# Patient Record
Sex: FEMALE | Race: WHITE | NOT HISPANIC OR LATINO | Employment: OTHER | URBAN - NONMETROPOLITAN AREA
[De-identification: names, ages, dates, MRNs, and addresses within clinical notes are randomized per-mention and may not be internally consistent; named-entity substitution may affect disease eponyms.]

---

## 2017-08-30 ENCOUNTER — OFFICE VISIT (OUTPATIENT)
Dept: OBSTETRICS AND GYNECOLOGY | Facility: CLINIC | Age: 52
End: 2017-08-30

## 2017-08-30 VITALS
DIASTOLIC BLOOD PRESSURE: 60 MMHG | BODY MASS INDEX: 22.34 KG/M2 | SYSTOLIC BLOOD PRESSURE: 112 MMHG | WEIGHT: 139 LBS | HEIGHT: 66 IN

## 2017-08-30 DIAGNOSIS — S30.814A LABIAL ABRASION, INITIAL ENCOUNTER: Primary | ICD-10-CM

## 2017-08-30 PROCEDURE — 99214 OFFICE O/P EST MOD 30 MIN: CPT | Performed by: OBSTETRICS & GYNECOLOGY

## 2017-08-30 RX ORDER — PAROXETINE 10 MG/1
TABLET, FILM COATED ORAL
COMMUNITY
Start: 2017-08-18

## 2017-08-30 NOTE — PROGRESS NOTES
Subjective   Chief Complaint   Patient presents with   • Labial Lesion     Pt states yesterday had a small bump appear in middle of labia, which burned and itched and today the bump is not as a big but really raw     Ilene Fowler is a 52 y.o. year old .  No LMP recorded. Patient is postmenopausal.  She presents to be seen because of   Pt states yesterday had a small bump appear in middle of labia, which burned and itched and today the bump is not as a big but really raw   Had this sort before without the bump though 2 months ago.   [place]  No recent intercourse. But had intercourse preceedning this initial episode  No OTC treatments    OTHER COMPLAINTS:  Nothing else    The following portions of the patient's history were reviewed and updated as appropriate:  She  has a past medical history of Anxiety and Arthritis.  She  does not have a problem list on file.  She  has a past surgical history that includes Tubal ligation.  Her family history includes Coronary artery disease in her brother; Diabetes in her brother; Lung cancer in her father.  She  reports that she has quit smoking. She has never used smokeless tobacco. She reports that she does not drink alcohol or use illicit drugs.  Current Outpatient Prescriptions   Medication Sig Dispense Refill   • PARoxetine (PAXIL) 10 MG tablet        No current facility-administered medications for this visit.      No current outpatient prescriptions on file prior to visit.     No current facility-administered medications on file prior to visit.      She has No Known Allergies.    Smoking status: Former Smoker                                                              Packs/day: 0.00      Years: 0.00      Smokeless status: Never Used                        Review of Systems  Consitutional POS: nothing reported    NEG: anorexia or night sweats   Gastointestinal POS: nothing reported    NEG: bloating, change in bowel habits, melena or reflux symptoms   Genitourinary  "POS: nothing reported    NEG: dysuria or hematuria   Integument POS: nothing reported    NEG: moles that are changing in size, shape, color or rashes   Breast POS: nothing reported    NEG: persistent breast lump, skin dimpling or nipple discharge         Ears, nose, mouth, throat, and face: negative  Respiratory: negative  Cardiovascular: negative  GYN:  as above  Hematologic/lymphatic: negative  Musculoskeletal:negative  Neurological: negative  Behavioral/Psych: negative  Endocrine: negative          Objective   /60  Ht 66\" (167.6 cm)  Wt 139 lb (63 kg)  BMI 22.44 kg/m2    General:  well developed; well nourished  no acute distress   Skin:  No suspicious lesions seen   Thyroid: normal to inspection and palpation   Lungs:  breathing is unlabored  clear to auscultation bilaterally   Heart:  regular rate and rhythm, S1, S2 normal, no murmur, click, rub or gallop   Breasts:  Not performed.   Abdomen: soft, non-tender; no masses  no umbilical or inginual hernias are present  no hepato-splenomegaly   Pelvis: Clinical staff was present for exam  External genitalia:  left labial excoriation  :  urethral meatus normal;  Vaginal:  normal pink mucosa without prolapse or lesions.  Cervix:  normal appearance.  Uterus:  normal size, shape and consistency.  Adnexa:  normal bimanual exam of the adnexa.  Rectal:  digital rectal exam not performed; anus visually normal appearing.     Psychiatric: Alert and oriented ×3, mood and affect appropriate  HEENT: Atraumatic, normocephalic, normal scleral icterus  Extremities: 2+ pulses bilaterally, no edema      Lab Review   No data reviewed    Imaging   No data reviewed        Assessment   1. Left labial excoriation-     Plan   1. Cultures taken, sitz baths, aquaphor         This note was electronically signed.      August 30, 2017      "

## 2017-08-30 NOTE — PATIENT INSTRUCTIONS
Abrasion  An abrasion is a cut or scrape on the outer surface of your skin. An abrasion does not extend through all of the layers of your skin. It is important to care for your abrasion properly to prevent infection.  CAUSES  Most abrasions are caused by falling on or gliding across the ground or another surface. When your skin rubs on something, the outer and inner layer of skin rubs off.   SYMPTOMS  A cut or scrape is the main symptom of this condition. The scrape may be bleeding, or it may appear red or pink. If there was an associated fall, there may be an underlying bruise.  DIAGNOSIS  An abrasion is diagnosed with a physical exam.  TREATMENT  Treatment for this condition depends on how large and deep the abrasion is. Usually, your abrasion will be cleaned with water and mild soap. This removes any dirt or debris that may be stuck. An antibiotic ointment may be applied to the abrasion to help prevent infection. A bandage (dressing) may be placed on the abrasion to keep it clean.  You may also need a tetanus shot.  HOME CARE INSTRUCTIONS  Medicines  · Take or apply medicines only as directed by your health care provider.  · If you were prescribed an antibiotic ointment, finish all of it even if you start to feel better.  Wound Care  · Clean the wound with mild soap and water 2-3 times per day or as directed by your health care provider. Pat your wound dry with a clean towel. Do not rub it.  · There are many different ways to close and cover a wound. Follow instructions from your health care provider about:    Wound care.    Dressing changes and removal.  · Check your wound every day for signs of infection. Watch for:    Redness, swelling, or pain.    Fluid, blood, or pus.  General Instructions  · Keep the dressing dry as directed by your health care provider. Do not take baths, swim, use a hot tub, or do anything that would put your wound underwater until your health care provider approves.  · If there is  swelling, raise (elevate) the injured area above the level of your heart while you are sitting or lying down.  · Keep all follow-up visits as directed by your health care provider. This is important.  SEEK MEDICAL CARE IF:  · You received a tetanus shot and you have swelling, severe pain, redness, or bleeding at the injection site.  · Your pain is not controlled with medicine.  · You have increased redness, swelling, or pain at the site of your wound.  SEEK IMMEDIATE MEDICAL CARE IF:  · You have a red streak going away from your wound.  · You have a fever.  · You have fluid, blood, or pus coming from your wound.  · You notice a bad smell coming from your wound or your dressing.     This information is not intended to replace advice given to you by your health care provider. Make sure you discuss any questions you have with your health care provider.     Document Released: 09/27/2006 Document Revised: 09/07/2016 Document Reviewed: 12/16/2015  [employer] Interactive Patient Education ©2017 [employer]

## 2017-09-03 LAB — WRITTEN AUTHORIZATION: NORMAL

## 2017-09-05 ENCOUNTER — TELEPHONE (OUTPATIENT)
Dept: OBSTETRICS AND GYNECOLOGY | Facility: CLINIC | Age: 52
End: 2017-09-05

## 2017-09-05 RX ORDER — VALACYCLOVIR HYDROCHLORIDE 500 MG/1
TABLET, FILM COATED ORAL
Qty: 30 TABLET | Refills: 0 | Status: SHIPPED | OUTPATIENT
Start: 2017-09-05 | End: 2017-09-05 | Stop reason: CLARIF

## 2017-09-05 RX ORDER — ACYCLOVIR 400 MG/1
TABLET ORAL
Qty: 30 TABLET | Refills: 0 | Status: SHIPPED | OUTPATIENT
Start: 2017-09-05 | End: 2019-12-18

## 2017-09-05 NOTE — TELEPHONE ENCOUNTER
----- Message from Surya Mccullough MD sent at 9/4/2017 11:55 AM EDT -----  Valtrex 1gm po BID x 10 days

## 2017-09-06 ENCOUNTER — OFFICE VISIT (OUTPATIENT)
Dept: OBSTETRICS AND GYNECOLOGY | Facility: CLINIC | Age: 52
End: 2017-09-06

## 2017-09-06 VITALS
SYSTOLIC BLOOD PRESSURE: 112 MMHG | WEIGHT: 138 LBS | DIASTOLIC BLOOD PRESSURE: 66 MMHG | HEIGHT: 66 IN | BODY MASS INDEX: 22.18 KG/M2

## 2017-09-06 DIAGNOSIS — B00.9 HSV-2 (HERPES SIMPLEX VIRUS 2) INFECTION: Primary | ICD-10-CM

## 2017-09-06 PROCEDURE — 99213 OFFICE O/P EST LOW 20 MIN: CPT | Performed by: OBSTETRICS & GYNECOLOGY

## 2017-09-06 RX ORDER — ACYCLOVIR 400 MG/1
400 TABLET ORAL 2 TIMES DAILY
Qty: 60 TABLET | Refills: 12 | Status: SHIPPED | OUTPATIENT
Start: 2017-09-06 | End: 2018-08-09 | Stop reason: SDUPTHER

## 2017-09-06 NOTE — PROGRESS NOTES
"Subjective   Chief Complaint   Patient presents with   • Follow-up     Pt would like to discuss recent lab results      Ilene Fowler is a 52 y.o. year old .  No LMP recorded. Patient is postmenopausal.  She presents to be seen because of recent dx of HSV II.     OTHER COMPLAINTS:  Nothing else    The following portions of the patient's history were reviewed and updated as appropriate:current medications, allergies, past family history, past medical history, past social history and past surgical history    Smoking status: Former Smoker                                                              Packs/day: 0.00      Years: 0.00      Smokeless status: Never Used                        Review of Systems  Consitutional POS: nothing reported    NEG: anorexia or night sweats   Gastointestinal POS: nothing reported    NEG: bloating, change in bowel habits, melena or reflux symptoms   Genitourinary POS: nothing reported    NEG: dysuria or hematuria   Integument POS: nothing reported    NEG: moles that are changing in size, shape, color or rashes   Breast POS: nothing reported    NEG: persistent breast lump, skin dimpling or nipple discharge         Pertinent items are noted in HPI.          Objective   /66  Ht 66\" (167.6 cm)  Wt 138 lb (62.6 kg)  BMI 22.27 kg/m2    General:  well developed; well nourished  no acute distress   Skin:  No suspicious lesions seen   Thyroid: normal to inspection and palpation   Lungs:  breathing is unlabored   Heart:  Not performed.   Breasts:  Examined in supine position  Symmetric without masses or skin dimpling  Nipples normal without inversion, lesions or discharge  There are no palpable axillary nodes   Abdomen: soft, non-tender; no masses  no umbilical or inginual hernias are present  no hepato-splenomegaly   Pelvis: Clinical staff was present for exam  External genitalia:  normal appearance of the external genitalia including Bartholin's and Odem's glands.  :  " urethral meatus normal;  Vaginal:  normal pink mucosa without prolapse or lesions.  Cervix:  normal appearance.  Uterus:  normal size, shape and consistency.  Adnexa:  normal bimanual exam of the adnexa.  Rectal:  digital rectal exam not performed; anus visually normal appearing.     Psychiatric: Alert and oriented ×3, mood and affect appropriate  HEENT: Atraumatic, normocephalic, normal scleral icterus  Extremities: 2+ pulses bilaterally, no edema      Lab Review   No data reviewed and STD swabs for HSV    Imaging   No data reviewed        Assessment   1. HSV II     Plan   1. Primary treament now-start Suppression  2. Acyclovir suppression    No orders of the defined types were placed in this encounter.         This note was electronically signed.      September 6, 2017

## 2017-09-06 NOTE — PATIENT INSTRUCTIONS
Herpes Simplex Test  There are two common types of herpes simplex virus (HSV). These are classified as Type 1 (HSV1) or Type 2 (HSV2). Type 1 often causes cold sores on or around the mouth and sometimes on or around the eyes. Type 2 is commonly known as a sexually transmitted infection that causes sores in and around the genitals. Both types of herpes simplex can cause sores in different areas.  There are two types of herpes simplex tests. These include:  · Culture. This consists of collecting and testing a sample of fluid with a cotton swab from an open sore. This can only be done during an active infection (outbreak). Culture tests take several days to complete but are very accurate.  · HSV blood tests. This test is not as accurate as a culture. However, HSV blood tests are faster than cultures, often providing a test result within one day.    HSV antibody test. This checks for the presence of antibodies against HSV in your blood. Antibodies are proteins your body makes to help fight infection.    HSV antigen test. This checks for the presence of the HSV germ (antigen) in your blood.  Your health care provider may recommend you have a HSV test if:  · He or she believes you have a HSV infection.  · You have a weakened immune system (immunocompromised) and you have sores around your mouth or genitals that look like HSV eruptions.  · You have a fever of unknown origin (FUO).  · You are pregnant, have herpes, and are expecting to deliver a baby vaginally in the next 6-8 weeks.  RESULTS  It is your responsibility to obtain your test results. Ask the lab or department performing the test when and how you will get your results. Contact your health care provider to discuss any questions you have about your results.  Range of Normal Values  Ranges for normal values may vary among different labs and hospitals. You should always check with your health care provider after having lab work or other tests done to discuss whether  your values are considered within normal limits. Normal findings include:  · No HSV antigen or antibodies present in your blood.  · No HSV antigen present in cultured fluid.  Meaning of Results Outside Normal Ranges  The following test results may indicate that you have an active HSV infection:  · Positive culture for HSV1 or HSV2.  · Presence of HSV1 or HSV2 antigens in your blood.  · Presence of certain HSV1 or HSV2 antibodies (IgM) in your blood.  Discuss your test results with your health care provider. He or she will use the results to make a diagnosis and determine a treatment plan that is right for you.     This information is not intended to replace advice given to you by your health care provider. Make sure you discuss any questions you have with your health care provider.     Document Released: 01/20/2006 Document Revised: 01/08/2016 Document Reviewed: 05/05/2015  [employer] Interactive Patient Education ©2017 [employer]

## 2017-09-08 LAB
HSV1 DNA SPEC QL NAA+PROBE: NORMAL
HSV2 DNA SPEC QL NAA+PROBE: NORMAL
Lab: NORMAL
REQUEST PROBLEM: NORMAL

## 2017-09-13 ENCOUNTER — OFFICE VISIT (OUTPATIENT)
Dept: OBSTETRICS AND GYNECOLOGY | Facility: CLINIC | Age: 52
End: 2017-09-13

## 2017-09-13 VITALS
WEIGHT: 138 LBS | SYSTOLIC BLOOD PRESSURE: 112 MMHG | HEIGHT: 66 IN | DIASTOLIC BLOOD PRESSURE: 70 MMHG | BODY MASS INDEX: 22.18 KG/M2

## 2017-09-13 DIAGNOSIS — Z01.419 ENCOUNTER FOR GYNECOLOGICAL EXAMINATION WITHOUT ABNORMAL FINDING: Primary | ICD-10-CM

## 2017-09-13 PROCEDURE — 99396 PREV VISIT EST AGE 40-64: CPT | Performed by: OBSTETRICS & GYNECOLOGY

## 2017-09-13 NOTE — PROGRESS NOTES
Subjective   Chief Complaint   Patient presents with   • Gynecologic Exam     Last pap 2015 WNL Hx of Abnormal Pap/ Leep 3 years ago        Ilene Fowler is a 52 y.o. year old  presenting to be seen for her annual exam.     SEXUAL Hx:  She is currently sexually active.  In the past year there has not been new sexual partners.    Condoms are not typically used.  She would not like to be screened for STD's at today's exam.  Current birth control method: not using any form of contraception and does not wish to get pregnant.  MENSTRUAL Hx:  No LMP recorded. Patient is postmenopausal.  In the past 6 months her cycles have been regular, predictable and occur monthly.   Her menstrual flow is normal.   Each month on average there are roughly 0 days of very heavy flow.    Intermenstrual bleeding is absent.    Post-coital bleeding is absent.  Dysmenorrhea: is not affecting her activities of daily living  PMS: is not affecting her activities of daily living  Her cycles are not a source of concern for her that she wishes to discuss today.  HEALTH Hx:  She exercises regularly: no (and has no plans to become more active).  She wears her seat belt:yes.  She has concerns about domestic violence: no.  OTHER COMPLAINTS:  Nothing else    The following portions of the patient's history were reviewed and updated as appropriate:  She  has a past medical history of Abnormal Pap smear of cervix; Anxiety; Arthritis; and HSV (herpes simplex virus) anogenital infection.  She  does not have any pertinent problems on file.  She  has a past surgical history that includes Tubal ligation and Cervical biopsy w/ loop electrode excision.  Her family history includes Coronary artery disease in her brother; Diabetes in her brother; Lung cancer in her father.  She  reports that she has quit smoking. She has never used smokeless tobacco. She reports that she does not drink alcohol or use illicit drugs.  Current Outpatient Prescriptions  "  Medication Sig Dispense Refill   • acyclovir (ZOVIRAX) 400 MG tablet Take po TID for 10 days 30 tablet 0   • acyclovir (ZOVIRAX) 400 MG tablet Take 1 tablet by mouth 2 (Two) Times a Day. If outbreak occurs while on suppressive therapy, call office for further instructions. 60 tablet 12   • PARoxetine (PAXIL) 10 MG tablet        No current facility-administered medications for this visit.      Current Outpatient Prescriptions on File Prior to Visit   Medication Sig   • acyclovir (ZOVIRAX) 400 MG tablet Take po TID for 10 days   • acyclovir (ZOVIRAX) 400 MG tablet Take 1 tablet by mouth 2 (Two) Times a Day. If outbreak occurs while on suppressive therapy, call office for further instructions.   • PARoxetine (PAXIL) 10 MG tablet      No current facility-administered medications on file prior to visit.      She has No Known Allergies..    Smoking status: Former Smoker                                                              Packs/day: 0.00      Years: 0.00      Smokeless status: Never Used                        Review of Systems  Consitutional NEG: anorexia or night sweats    POS: nothing reported   Gastointestinal NEG: bloating, change in bowel habits, melena or reflux symptoms    POS: nothing reported   Genitourinary NEG: dysuria or hematuria    POS: nothing reported   Integument NEG: moles that are changing in size, shape, color or rashes    POS: nothing reported   Breast NEG: persistent breast lump, skin dimpling or nipple discharge    POS: nothing reported          Objective   /70  Ht 66\" (167.6 cm)  Wt 138 lb (62.6 kg)  BMI 22.27 kg/m2    General:  well developed; well nourished  no acute distress  appears stated age   Skin:  No suspicious lesions seen   Thyroid: normal to inspection and palpation   Breasts:  Examined in supine position  Symmetric without masses or skin dimpling  Nipples normal without inversion, lesions or discharge  There are no palpable axillary nodes   Abdomen: soft, " non-tender; no masses  no umbilical or inginual hernias are present  no hepato-splenomegaly   Pelvis: Clinical staff was present for exam  External genitalia:  normal appearance of the external genitalia including Bartholin's and Parlier's glands.  :  urethral meatus normal;  Vaginal:  normal pink mucosa without prolapse or lesions.  Cervix:  normal appearance.  Uterus:  normal size, shape and consistency.  Adnexa:  normal bimanual exam of the adnexa.  Rectal:  digital rectal exam not performed; anus visually normal appearing.        Assessment   1. Normal PE     Plan   1. PAP done  2. MMG  3. Follow up     No orders of the defined types were placed in this encounter.         This note was electronically signed.      September 13, 2017

## 2017-09-28 DIAGNOSIS — Z01.419 ENCOUNTER FOR GYNECOLOGICAL EXAMINATION WITHOUT ABNORMAL FINDING: ICD-10-CM

## 2017-10-18 ENCOUNTER — OFFICE VISIT (OUTPATIENT)
Dept: OBSTETRICS AND GYNECOLOGY | Facility: CLINIC | Age: 52
End: 2017-10-18

## 2017-10-18 VITALS
DIASTOLIC BLOOD PRESSURE: 70 MMHG | WEIGHT: 140 LBS | BODY MASS INDEX: 22.5 KG/M2 | SYSTOLIC BLOOD PRESSURE: 116 MMHG | HEIGHT: 66 IN

## 2017-10-18 DIAGNOSIS — R87.610 PAP SMEAR ABNORMALITY OF CERVIX WITH ASCUS FAVORING BENIGN: Primary | ICD-10-CM

## 2017-10-18 PROCEDURE — 57454 BX/CURETT OF CERVIX W/SCOPE: CPT | Performed by: OBSTETRICS & GYNECOLOGY

## 2017-10-18 NOTE — PROGRESS NOTES
Colposcopy    Date of procedure:  10/18/2017    Risks and benefits discussed? yes  All questions answered? yes  Consents given by the patient  Written consent obtained? yes    Pre-op indication: ASCUS with POSITIVE high risk HPV, H/O LEEP 2.5 years ago  Procedure documentation:    The cervix was initially viewed colposcopically.  The cervix was next bathed in acetic acid.   The findings were as follows:      The transformation zone was able to be seen adequately.    No visible lesions    Ectocervical biopsies taken from 1 o'clock.  Monsels solution was applied to the biopsy sites..    An ECC was performed.      Colposcopic Impression: 1. Adequate colposcopy  2. Colposcopic findings are consistent with PAP       Plan: Will base further treatment on pathology results      This note was electronically signed.    Surya Mccullough MD.

## 2017-10-18 NOTE — PATIENT INSTRUCTIONS
Colposcopy  Colposcopy is a procedure to examine your cervix and vagina, or the area around the outside of your vagina, for abnormalities or signs of disease. The procedure is done using a lighted microscope called a colposcope. Tissue samples may be collected during the colposcopy if your health care provider finds any unusual cells. A colposcopy may be done if a woman has:  · An abnormal Pap test. A Pap test is a medical test done to evaluate cells that are on the surface of the cervix.  · A Pap test result that is suggestive of human papillomavirus (HPV). This virus can cause genital warts and is linked to the development of cervical cancer.  · A sore on her cervix and the results of a Pap test were normal.  · Genital warts on the cervix or in or around the outside of the vagina.  · A mother who took the drug diethylstilbestrol (LISSET) while pregnant.  · Painful intercourse.  · Vaginal bleeding, especially after sexual intercourse.  LET YOUR HEALTH CARE PROVIDER KNOW ABOUT:  · Any allergies you have.  · All medicines you are taking, including vitamins, herbs, eye drops, creams, and over-the-counter medicines.  · Previous problems you or members of your family have had with the use of anesthetics.  · Any blood disorders you have.  · Previous surgeries you have had.  · Medical conditions you have.  RISKS AND COMPLICATIONS  Generally, a colposcopy is a safe procedure. However, as with any procedure, complications can occur. Possible complications include:  · Bleeding.  · Infection.  · Missed lesions.  BEFORE THE PROCEDURE   · Tell your health care provider if you have your menstrual period. A colposcopy typically is not done during menstruation.  · For 24 hours before the colposcopy, do not:    [place].    Use tampons.    Use medicines, creams, or suppositories in the vagina.    Have sexual intercourse.  PROCEDURE   During the procedure, you will be lying on your back with your feet in foot rests (stirrups). A warm  metal or plastic instrument (speculum) will be placed in your vagina to keep it open and to allow the health care provider to see the cervix. The colposcope will be placed outside the vagina. It will be used to magnify and examine the cervix, vagina, and the area around the outside of the vagina. A small amount of liquid solution will be placed on the area that is to be viewed. This solution will make it easier to see the abnormal cells. Your health care provider will use tools to suck out mucus and cells from the canal of the cervix. Then he or she will record the location of the abnormal areas.  If a biopsy is done during the procedure, a medicine will usually be given to numb the area (local anesthetic). You may feel mild pain or cramping while the biopsy is done. After the procedure, tissue samples collected during the biopsy will be sent to a lab for analysis.  AFTER THE PROCEDURE   You will be given instructions on when to follow up with your health care provider for your test results. It is important to keep your appointment.     This information is not intended to replace advice given to you by your health care provider. Make sure you discuss any questions you have with your health care provider.     Document Released: 03/09/2004 Document Revised: 08/20/2014 Document Reviewed: 07/17/2014  [employer] Interactive Patient Education ©2017 [employer]

## 2017-10-26 DIAGNOSIS — R87.610 PAP SMEAR ABNORMALITY OF CERVIX WITH ASCUS FAVORING BENIGN: ICD-10-CM

## 2017-10-27 DIAGNOSIS — R87.610 PAP SMEAR ABNORMALITY OF CERVIX WITH ASCUS FAVORING BENIGN: ICD-10-CM

## 2017-11-06 ENCOUNTER — HOSPITAL ENCOUNTER (OUTPATIENT)
Dept: MAMMOGRAPHY | Facility: HOSPITAL | Age: 52
Discharge: HOME | End: 2017-11-06
Attending: OBSTETRICS & GYNECOLOGY | Admitting: OBSTETRICS & GYNECOLOGY

## 2017-11-06 DIAGNOSIS — Z01.419 ENCOUNTER FOR GYNECOLOGICAL EXAMINATION WITHOUT ABNORMAL FINDING: ICD-10-CM

## 2017-11-06 PROCEDURE — 77063 BREAST TOMOSYNTHESIS BI: CPT

## 2017-11-06 PROCEDURE — G0202 SCR MAMMO BI INCL CAD: HCPCS

## 2018-02-02 ENCOUNTER — TELEPHONE (OUTPATIENT)
Dept: OBSTETRICS AND GYNECOLOGY | Facility: CLINIC | Age: 53
End: 2018-02-02

## 2018-02-02 NOTE — TELEPHONE ENCOUNTER
----- Message from Noa Gutierrez sent at 2/2/2018  2:54 PM EST -----  Contact: pt  THIS IS DR GARCIA'S PT.  SHE CALLED REQUESTING REFILL ON ESTRACE CREAM.  SHE USES [facility].  THANKS

## 2018-02-05 RX ORDER — ESTRADIOL 0.1 MG/G
CREAM VAGINAL
Qty: 42.5 G | Refills: 12 | Status: SHIPPED | OUTPATIENT
Start: 2018-02-05 | End: 2018-10-24 | Stop reason: SDUPTHER

## 2018-02-06 ENCOUNTER — TELEPHONE (OUTPATIENT)
Dept: OBSTETRICS AND GYNECOLOGY | Facility: CLINIC | Age: 53
End: 2018-02-06

## 2018-02-06 NOTE — TELEPHONE ENCOUNTER
,     Pts insurance will not cover the estrace or estradiol cream, can I send the compounded cream to [facility] pharmacy?

## 2018-04-26 ENCOUNTER — OFFICE VISIT (OUTPATIENT)
Dept: OBSTETRICS AND GYNECOLOGY | Facility: CLINIC | Age: 53
End: 2018-04-26

## 2018-04-26 VITALS
BODY MASS INDEX: 23.14 KG/M2 | HEIGHT: 66 IN | DIASTOLIC BLOOD PRESSURE: 70 MMHG | WEIGHT: 144 LBS | SYSTOLIC BLOOD PRESSURE: 116 MMHG

## 2018-04-26 DIAGNOSIS — R87.612 PAP SMEAR ABNORMALITY OF CERVIX WITH LGSIL: Primary | ICD-10-CM

## 2018-04-26 PROCEDURE — 99213 OFFICE O/P EST LOW 20 MIN: CPT | Performed by: OBSTETRICS & GYNECOLOGY

## 2018-04-26 NOTE — PROGRESS NOTES
Subjective   Chief Complaint   Patient presents with   • Repeat Pap     Pap 2017 Ascus HPV +, Colpo 10/18/2017     Ilene Fowler is a 52 y.o. year old .  No LMP recorded. Patient is postmenopausal.  She presents to be seen because of LSIL pap with negative colpo this past fall.  Patient is getting compounded estrogen cream denies pharmacy in [place]..     OTHER COMPLAINTS:  Nothing else    The following portions of the patient's history were reviewed and updated as appropriate:  She  has a past medical history of Abnormal Pap smear of cervix; Anxiety; Arthritis; History of colposcopy (10/2017); and HSV (herpes simplex virus) anogenital infection.  She  does not have any pertinent problems on file.  She  has a past surgical history that includes Tubal ligation and Cervical biopsy w/ loop electrode excision.  Her family history includes Coronary artery disease in her brother; Diabetes in her brother; Lung cancer in her father.  She  reports that she has quit smoking. She has never used smokeless tobacco. She reports that she does not drink alcohol or use drugs.  Current Outpatient Prescriptions   Medication Sig Dispense Refill   • acyclovir (ZOVIRAX) 400 MG tablet Take po TID for 10 days 30 tablet 0   • acyclovir (ZOVIRAX) 400 MG tablet Take 1 tablet by mouth 2 (Two) Times a Day. If outbreak occurs while on suppressive therapy, call office for further instructions. 60 tablet 12   • estradiol (ESTRACE VAGINAL) 0.1 MG/GM vaginal cream INSERT IN VAGINA 2-3 TIMES WEEKLY 42.5 g 12   • PARoxetine (PAXIL) 10 MG tablet        No current facility-administered medications for this visit.      Current Outpatient Prescriptions on File Prior to Visit   Medication Sig   • acyclovir (ZOVIRAX) 400 MG tablet Take po TID for 10 days   • acyclovir (ZOVIRAX) 400 MG tablet Take 1 tablet by mouth 2 (Two) Times a Day. If outbreak occurs while on suppressive therapy, call office for further instructions.   • estradiol (ESTRACE  "VAGINAL) 0.1 MG/GM vaginal cream INSERT IN VAGINA 2-3 TIMES WEEKLY   • PARoxetine (PAXIL) 10 MG tablet      No current facility-administered medications on file prior to visit.      She has No Known Allergies.    Smoking status: Former Smoker                                                              Packs/day: 0.00      Years: 0.00      Smokeless tobacco: Never Used                        Review of Systems  Consitutional POS: nothing reported    NEG: anorexia or night sweats   Gastointestinal POS: nothing reported    NEG: bloating, change in bowel habits, melena or reflux symptoms   Genitourinary POS: nothing reported    NEG: dysuria or hematuria   Integument POS: nothing reported    NEG: moles that are changing in size, shape, color or rashes   Breast POS: nothing reported    NEG: persistent breast lump, skin dimpling or nipple discharge         Pertinent items are noted in HPI.          Objective   /70   Ht 167.6 cm (66\")   Wt 65.3 kg (144 lb)   BMI 23.24 kg/m²     General:  well developed; well nourished  no acute distress   Skin:  No suspicious lesions seen   Thyroid: normal to inspection and palpation   Lungs:  breathing is unlabored   Heart:  regular rate and rhythm, S1, S2 normal, no murmur, click, rub or gallop   Breasts:  Not performed.   Abdomen: soft, non-tender; no masses  no umbilical or inginual hernias are present  no hepato-splenomegaly   Pelvis: Clinical staff was present for exam  External genitalia:  normal appearance of the external genitalia including Bartholin's and [place]'s glands.  :  urethral meatus normal;  Vaginal:  atrophic mucosal changes are present;  Cervix:  normal appearance.  Uterus:  normal size, shape and consistency.  Adnexa:  normal bimanual exam of the adnexa.     Psychiatric: Alert and oriented ×3, mood and affect appropriate  HEENT: Atraumatic, normocephalic, normal scleral icterus  Extremities: 2+ pulses bilaterally, no edema      Lab Review   No data " reviewed    Imaging   No data reviewed        Assessment   1. H/O LSIL PAP  2. VV Atrophy     Plan   1. PAP done  2. Continue E2 compounded ream 1/4 appl 2 x week @ night    No orders of the defined types were placed in this encounter.         This note was electronically signed.      April 26, 2018

## 2018-05-03 DIAGNOSIS — R87.612 PAP SMEAR ABNORMALITY OF CERVIX WITH LGSIL: ICD-10-CM

## 2018-08-08 ENCOUNTER — TELEPHONE (OUTPATIENT)
Dept: OBSTETRICS AND GYNECOLOGY | Facility: CLINIC | Age: 53
End: 2018-08-08

## 2018-08-08 NOTE — TELEPHONE ENCOUNTER
----- Message from Noa Gutierrez sent at 8/8/2018  8:36 AM EDT -----  Contact: PT  THIS IS DR GARCIA'S PT.  SHE CALLED REQUESTING REFILL ON ACYCLOVIR 400MG.  SHE TRIED TO REFILL AT THE PHARMACY AND THEY SAID IT WAS TOO SOON, BUT SHE IS COMPLETELY OUT.  CAN WE SEND NEW RX TO [facility]?  THANKS

## 2018-08-09 RX ORDER — ACYCLOVIR 400 MG/1
400 TABLET ORAL
Qty: 60 TABLET | Refills: 11 | Status: SHIPPED | OUTPATIENT
Start: 2018-08-09 | End: 2019-12-18

## 2018-10-09 RX ORDER — ACYCLOVIR 400 MG/1
TABLET ORAL
Qty: 60 TABLET | Refills: 7 | Status: SHIPPED | OUTPATIENT
Start: 2018-10-09 | End: 2019-12-18

## 2018-10-24 ENCOUNTER — OFFICE VISIT (OUTPATIENT)
Dept: OBSTETRICS AND GYNECOLOGY | Facility: CLINIC | Age: 53
End: 2018-10-24

## 2018-10-24 VITALS
WEIGHT: 148 LBS | BODY MASS INDEX: 23.78 KG/M2 | DIASTOLIC BLOOD PRESSURE: 70 MMHG | HEIGHT: 66 IN | SYSTOLIC BLOOD PRESSURE: 118 MMHG

## 2018-10-24 DIAGNOSIS — N95.2 ATROPHIC VAGINITIS: Primary | ICD-10-CM

## 2018-10-24 DIAGNOSIS — Z12.39 SCREENING FOR BREAST CANCER: ICD-10-CM

## 2018-10-24 PROCEDURE — 99213 OFFICE O/P EST LOW 20 MIN: CPT | Performed by: OBSTETRICS & GYNECOLOGY

## 2018-10-24 RX ORDER — ESTRADIOL 0.1 MG/G
CREAM VAGINAL
Qty: 42.5 G | Refills: 12 | Status: SHIPPED | OUTPATIENT
Start: 2018-10-24 | End: 2019-11-21 | Stop reason: SDUPTHER

## 2018-10-24 NOTE — PROGRESS NOTES
"Subjective   Chief Complaint   Patient presents with   • Follow-up     Vaginal Atrophy- Rstrogen Compounded Cream     Ilene Fowler is a 53 y.o. year old .  No LMP recorded. Patient is postmenopausal.  She presents to be seen because of a chief complaints of vaginal atrophy.  On questioning patient she's not really been using her Estrace cream regularly perhaps a couple times a month at best.  Originally she was getting compounded form through [facility] pharmacy in [place] secondary to transient to deny although patient thinks now she can get Estrace coverage..     OTHER COMPLAINTS:  Nothing else    The following portions of the patient's history were reviewed and updated as appropriate:current medications and allergies    Smoking status: Former Smoker                                                              Packs/day: 0.00      Years: 0.00      Smokeless tobacco: Never Used                        Review of Systems  Consitutional POS: nothing reported    NEG: anorexia or night sweats   Gastointestinal POS: nothing reported    NEG: bloating, change in bowel habits, melena or reflux symptoms   Genitourinary POS: nothing reported    NEG: dysuria or hematuria   Integument POS: nothing reported    NEG: moles that are changing in size, shape, color or rashes   Breast POS: nothing reported    NEG: persistent breast lump, skin dimpling or nipple discharge         Pertinent items are noted in HPI.          Objective   /70   Ht 167.6 cm (66\")   Wt 67.1 kg (148 lb)   BMI 23.89 kg/m²     General:  well developed; well nourished  no acute distress   Skin:  No suspicious lesions seen   Thyroid: not examined   Lungs:  breathing is unlabored   Heart:  Not performed.   Breasts:  Not performed.   Abdomen: Not performed.   Pelvis: Not performed.     Psychiatric: Alert and oriented ×3, mood and affect appropriate  HEENT: Atraumatic, normocephalic, normal scleral icterus  Extremities: 2+ pulses bilaterally, no " edema      Lab Review   Pap within normal limits 4/26/18    Imaging   No data reviewed        Assessment   1. VV atrophy     Plan   1. Instructed on proper use of estrogen cream.  Patient will start Estrace half an applicator 3 times a week at night for 3 months, then drop down to twice a week at night thereafter   2. Follow up for annual exam    No orders of the defined types were placed in this encounter.         This note was electronically signed.      October 24, 2018

## 2018-12-07 ENCOUNTER — HOSPITAL ENCOUNTER (OUTPATIENT)
Dept: MAMMOGRAPHY | Facility: HOSPITAL | Age: 53
Discharge: HOME | End: 2018-12-07
Attending: OBSTETRICS & GYNECOLOGY | Admitting: OBSTETRICS & GYNECOLOGY

## 2018-12-07 DIAGNOSIS — Z12.39 SCREENING FOR BREAST CANCER: ICD-10-CM

## 2018-12-07 PROCEDURE — 77063 BREAST TOMOSYNTHESIS BI: CPT

## 2018-12-07 PROCEDURE — 77067 SCR MAMMO BI INCL CAD: CPT

## 2019-06-13 RX ORDER — ACYCLOVIR 400 MG/1
TABLET ORAL
Qty: 60 TABLET | Refills: 7 | Status: SHIPPED | OUTPATIENT
Start: 2019-06-13 | End: 2019-12-18

## 2019-11-22 RX ORDER — ESTRADIOL 0.1 MG/G
CREAM VAGINAL
Qty: 42.5 G | Refills: 12 | Status: SHIPPED | OUTPATIENT
Start: 2019-11-22 | End: 2019-12-18 | Stop reason: SDUPTHER

## 2019-12-18 ENCOUNTER — OFFICE VISIT (OUTPATIENT)
Dept: OBSTETRICS AND GYNECOLOGY | Facility: CLINIC | Age: 54
End: 2019-12-18

## 2019-12-18 VITALS
SYSTOLIC BLOOD PRESSURE: 112 MMHG | HEIGHT: 66 IN | BODY MASS INDEX: 27 KG/M2 | DIASTOLIC BLOOD PRESSURE: 70 MMHG | WEIGHT: 168 LBS

## 2019-12-18 DIAGNOSIS — Z01.419 ENCOUNTER FOR GYNECOLOGICAL EXAMINATION WITHOUT ABNORMAL FINDING: Primary | ICD-10-CM

## 2019-12-18 PROCEDURE — 99396 PREV VISIT EST AGE 40-64: CPT | Performed by: OBSTETRICS & GYNECOLOGY

## 2019-12-18 RX ORDER — ACYCLOVIR 400 MG/1
400 TABLET ORAL 2 TIMES DAILY
Qty: 60 TABLET | Refills: 12 | Status: SHIPPED | OUTPATIENT
Start: 2019-12-18 | End: 2020-12-21

## 2019-12-18 RX ORDER — ESTRADIOL 0.1 MG/G
CREAM VAGINAL
Qty: 42.5 G | Refills: 12 | Status: SHIPPED | OUTPATIENT
Start: 2019-12-18 | End: 2020-11-25

## 2019-12-18 NOTE — PROGRESS NOTES
Subjective   Chief Complaint   Patient presents with   • Gynecologic Exam     last pap 2018 WNL, Last Mammogram last year, needs order for here at ,    • Med Refill     needs yearly refill on Acyclovir and premarin cream     Ilene Fowler is a 54 y.o. year old  presenting to be seen for her annual exam. MMG needed, cologard negative last year-- no FHX colon cacner  SEXUAL Hx:  She is currently sexually active.  In the past year there has not been new sexual partners.    Condoms are not typically used.  She would not like to be screened for STD's at today's exam.  Current birth control method: abstinence.  MENSTRUAL Hx:  No LMP recorded. Patient is postmenopausal.  In the past 6 months her cycles have been absent.   Her menstrual flow has been absent.   Each month on average there are roughly 0 days of very heavy flow.    Intermenstrual bleeding is absent.    Post-coital bleeding is absent.  Dysmenorrhea: is not affecting her activities of daily living  PMS: is not affecting her activities of daily living  Her cycles are not a source of concern for her that she wishes to discuss today.  HEALTH Hx:  She exercises regularly: no (and has no plans to become more active).  She wears her seat belt:yes.  She has concerns about domestic violence: no.  OTHER COMPLAINTS:  Nothing else    The following portions of the patient's history were reviewed and updated as appropriate:  She  has a past medical history of Abnormal Pap smear of cervix, Anxiety, Arthritis, History of colposcopy (10/2017), and HSV (herpes simplex virus) anogenital infection.  She does not have any pertinent problems on file.  She  has a past surgical history that includes Tubal ligation and Cervical biopsy w/ loop electrode excision.  Her family history includes Coronary artery disease in her brother; Diabetes in her brother; Lung cancer in her father.  She  reports that she has quit smoking. She has never used smokeless tobacco. She  reports that she does not drink alcohol or use drugs.  Current Outpatient Medications   Medication Sig Dispense Refill   • acyclovir (ZOVIRAX) 400 MG tablet Take po TID for 10 days 30 tablet 0   • acyclovir (ZOVIRAX) 400 MG tablet Take 1 tablet by mouth Every 4 (Four) Hours While Awake. Take no more than 5 doses a day. 60 tablet 11   • acyclovir (ZOVIRAX) 400 MG tablet TAKE 1 TABLET (400MG) BY MOUTH TWICE DAILY 60 tablet 7   • acyclovir (ZOVIRAX) 400 MG tablet TAKE 1 TABLET (400MG) BY MOUTH TWICE DAILY 60 tablet 7   • estradiol (ESTRACE) 0.1 MG/GM vaginal cream INSERT 1/2 APPLICATORFUL VAGINALLY AT BEDTIME THREE TIMES WEEKLY FOR 3 MONTHS THEN TWICE WEEKLY 42.5 g 12   • PARoxetine (PAXIL) 10 MG tablet        No current facility-administered medications for this visit.      Current Outpatient Medications on File Prior to Visit   Medication Sig   • acyclovir (ZOVIRAX) 400 MG tablet Take po TID for 10 days   • acyclovir (ZOVIRAX) 400 MG tablet Take 1 tablet by mouth Every 4 (Four) Hours While Awake. Take no more than 5 doses a day.   • acyclovir (ZOVIRAX) 400 MG tablet TAKE 1 TABLET (400MG) BY MOUTH TWICE DAILY   • acyclovir (ZOVIRAX) 400 MG tablet TAKE 1 TABLET (400MG) BY MOUTH TWICE DAILY   • estradiol (ESTRACE) 0.1 MG/GM vaginal cream INSERT 1/2 APPLICATORFUL VAGINALLY AT BEDTIME THREE TIMES WEEKLY FOR 3 MONTHS THEN TWICE WEEKLY   • PARoxetine (PAXIL) 10 MG tablet      No current facility-administered medications on file prior to visit.      She has No Known Allergies..    Social History    Tobacco Use      Smoking status: Former Smoker      Smokeless tobacco: Never Used    Review of Systems  Consitutional NEG: anorexia or night sweats    POS: nothing reported   Gastointestinal NEG: bloating, change in bowel habits, melena or reflux symptoms    POS: nothing reported   Genitourinary NEG: dysuria or hematuria    POS: nothing reported   Integument NEG: moles that are changing in size, shape, color or rashes    POS:  "nothing reported   Breast NEG: persistent breast lump, skin dimpling or nipple discharge    POS: nothing reported          Objective   /70   Ht 167.6 cm (66\")   Wt 76.2 kg (168 lb)   BMI 27.12 kg/m²     General:  well developed; well nourished  no acute distress   Skin:  No suspicious lesions seen   Thyroid: normal to inspection and palpation   Breasts:  Examined in supine position  Symmetric without masses or skin dimpling  Nipples normal without inversion, lesions or discharge  There are no palpable axillary nodes   Abdomen: soft, non-tender; no masses  no umbilical or inguinal hernias are present  no hepato-splenomegaly   Pelvis: Clinical staff was present for exam  External genitalia:  normal appearance of the external genitalia including Bartholin's and [place]'s glands.  :  urethral meatus normal;  Vaginal:  normal pink mucosa without prolapse or lesions.  Cervix:  normal appearance.  Uterus:  normal size, shape and consistency.  Adnexa:  normal bimanual exam of the adnexa.  Rectal:  digital rectal exam not performed; anus visually normal appearing.        Assessment   1. Normal PE     Plan   1. PAP done  2. MMG ordered  3. Acyclovir 400mg BID suppresion reflled, E2 cream refilled  4. Follow up     No orders of the defined types were placed in this encounter.         This note was electronically signed.      December 18, 2019    "

## 2019-12-24 DIAGNOSIS — Z01.419 ENCOUNTER FOR GYNECOLOGICAL EXAMINATION WITHOUT ABNORMAL FINDING: ICD-10-CM

## 2020-02-07 ENCOUNTER — HOSPITAL ENCOUNTER (OUTPATIENT)
Dept: MAMMOGRAPHY | Facility: HOSPITAL | Age: 55
Discharge: HOME | End: 2020-02-07
Admitting: OBSTETRICS & GYNECOLOGY

## 2020-02-07 DIAGNOSIS — Z01.419 ENCOUNTER FOR GYNECOLOGICAL EXAMINATION WITHOUT ABNORMAL FINDING: ICD-10-CM

## 2020-02-07 PROCEDURE — 77063 BREAST TOMOSYNTHESIS BI: CPT

## 2020-02-07 PROCEDURE — 77067 SCR MAMMO BI INCL CAD: CPT

## 2020-11-25 RX ORDER — ESTRADIOL 0.1 MG/G
CREAM VAGINAL
Qty: 42.5 G | Refills: 11 | Status: SHIPPED | OUTPATIENT
Start: 2020-11-25 | End: 2021-03-24 | Stop reason: SDUPTHER

## 2020-12-21 RX ORDER — ACYCLOVIR 400 MG/1
400 TABLET ORAL 2 TIMES DAILY
Qty: 60 TABLET | Refills: 11 | Status: SHIPPED | OUTPATIENT
Start: 2020-12-21 | End: 2021-03-24 | Stop reason: SDUPTHER

## 2021-03-24 ENCOUNTER — OFFICE VISIT (OUTPATIENT)
Dept: OBSTETRICS AND GYNECOLOGY | Facility: CLINIC | Age: 56
End: 2021-03-24

## 2021-03-24 VITALS
SYSTOLIC BLOOD PRESSURE: 118 MMHG | BODY MASS INDEX: 28.93 KG/M2 | WEIGHT: 180 LBS | DIASTOLIC BLOOD PRESSURE: 74 MMHG | HEIGHT: 66 IN

## 2021-03-24 DIAGNOSIS — Z01.419 ENCOUNTER FOR GYNECOLOGICAL EXAMINATION WITHOUT ABNORMAL FINDING: Primary | ICD-10-CM

## 2021-03-24 PROCEDURE — 99396 PREV VISIT EST AGE 40-64: CPT | Performed by: OBSTETRICS & GYNECOLOGY

## 2021-03-24 RX ORDER — ESTRADIOL 0.1 MG/G
CREAM VAGINAL
Qty: 42.5 G | Refills: 11 | Status: SHIPPED | OUTPATIENT
Start: 2021-03-24 | End: 2022-03-25 | Stop reason: SDUPTHER

## 2021-03-24 RX ORDER — ACYCLOVIR 400 MG/1
400 TABLET ORAL 2 TIMES DAILY
Qty: 60 TABLET | Refills: 11 | Status: SHIPPED | OUTPATIENT
Start: 2021-03-24 | End: 2022-03-30 | Stop reason: SDUPTHER

## 2021-03-24 NOTE — PROGRESS NOTES
Subjective   Chief Complaint   Patient presents with   • Gynecologic Exam     Last pap 2019 WNL , LAst Mamm 2020 , Needs order for new Mammogram   • Med Refill     needs refills on Acyclovir and Estradiol      Ilene Fowler is a 55 y.o. year old  presenting to be seen for her annual exam. Doing well    SEXUAL Hx:  She is currently sexually active.  In the past year there has not been new sexual partners.    Condoms are not typically used.  She would not like to be screened for STD's at today's exam.  Current birth control method: not using any form of contraception and does not wish to get pregnant.  MENSTRUAL Hx:  No LMP recorded. Patient is postmenopausal.  In the past 6 months her cycles have been absent.   Her menstrual flow has been absent.   Each month on average there are roughly 0 days of very heavy flow.    Intermenstrual bleeding is absent.    Post-coital bleeding is absent.  Dysmenorrhea: is not affecting her activities of daily living  PMS: is not affecting her activities of daily living  Her cycles are not a source of concern for her that she wishes to discuss today.  HEALTH Hx:  She exercises regularly: no (and has no plans to become more active).  She wears her seat belt:yes.  She has concerns about domestic violence: no.  OTHER COMPLAINTS:  Nothing else    The following portions of the patient's history were reviewed and updated as appropriate:  She  has a past medical history of Abnormal Pap smear of cervix, Anxiety, Arthritis, History of colposcopy (10/2017), and HSV (herpes simplex virus) anogenital infection.  She does not have any pertinent problems on file.  She  has a past surgical history that includes Tubal ligation and Cervical biopsy w/ loop electrode excision.  Her family history includes Coronary artery disease in her brother; Diabetes in her brother; Lung cancer in her father.  She  reports that she has quit smoking. She has never used smokeless tobacco. She reports that she  "does not drink alcohol and does not use drugs.  Current Outpatient Medications   Medication Sig Dispense Refill   • acyclovir (ZOVIRAX) 400 MG tablet TAKE 1 TABLET BY MOUTH 2 (TWO) TIMES A DAY. TAKE NO MORE THAN 5 DOSES A DAY. 60 tablet 11   • estradiol (ESTRACE) 0.1 MG/GM vaginal cream INSERT 1/2 APPLICATORFUL VAGINALLY AT BEDTIME THREE TIMES WEEKLY FOR 3 MONTHS THEN TWICE WEEKLY 42.5 g 11   • PARoxetine (PAXIL) 10 MG tablet        No current facility-administered medications for this visit.     Current Outpatient Medications on File Prior to Visit   Medication Sig   • acyclovir (ZOVIRAX) 400 MG tablet TAKE 1 TABLET BY MOUTH 2 (TWO) TIMES A DAY. TAKE NO MORE THAN 5 DOSES A DAY.   • estradiol (ESTRACE) 0.1 MG/GM vaginal cream INSERT 1/2 APPLICATORFUL VAGINALLY AT BEDTIME THREE TIMES WEEKLY FOR 3 MONTHS THEN TWICE WEEKLY   • PARoxetine (PAXIL) 10 MG tablet      No current facility-administered medications on file prior to visit.     She has No Known Allergies..    Social History    Tobacco Use      Smoking status: Former Smoker      Smokeless tobacco: Never Used    Review of Systems  Consitutional NEG: anorexia or night sweats    POS: nothing reported   Gastointestinal NEG: bloating, change in bowel habits, melena or reflux symptoms    POS: nothing reported   Genitourinary NEG: dysuria or hematuria    POS: nothing reported   Integument NEG: moles that are changing in size, shape, color or rashes    POS: nothing reported   Breast NEG: persistent breast lump, skin dimpling or nipple discharge    POS: nothing reported          Objective   /74   Ht 167.6 cm (66\")   Wt 81.6 kg (180 lb)   BMI 29.05 kg/m²     General:  well developed; well nourished  no acute distress   Skin:  No suspicious lesions seen   Thyroid: normal to inspection and palpation   Breasts:  Not performed.   Abdomen: soft, non-tender; no masses  no umbilical or inguinal hernias are present  no hepato-splenomegaly   Pelvis: Clinical staff was " present for exam  External genitalia:  normal appearance of the external genitalia including Bartholin's and [place]'s glands.  :  urethral meatus normal;  Vaginal:  normal pink mucosa without prolapse or lesions.  Cervix:  normal appearance.  Uterus:  normal size, shape and consistency.  Adnexa:  normal bimanual exam of the adnexa.  Rectal:  digital rectal exam not performed; anus visually normal appearing.        Assessment   1. Normal PE     Plan   1. PAP done  2. Acyclovir refilled for suppression, Estrace vaginal cream refilled  3. Diet/exercise    No orders of the defined types were placed in this encounter.         This note was electronically signed.      March 24, 2021

## 2021-04-01 DIAGNOSIS — Z01.419 ENCOUNTER FOR GYNECOLOGICAL EXAMINATION WITHOUT ABNORMAL FINDING: ICD-10-CM

## 2021-05-06 ENCOUNTER — HOSPITAL ENCOUNTER (OUTPATIENT)
Dept: MAMMOGRAPHY | Facility: HOSPITAL | Age: 56
Discharge: HOME | End: 2021-05-06
Admitting: OBSTETRICS & GYNECOLOGY

## 2021-05-06 DIAGNOSIS — Z01.419 ENCOUNTER FOR GYNECOLOGICAL EXAMINATION WITHOUT ABNORMAL FINDING: ICD-10-CM

## 2021-05-06 PROCEDURE — 77067 SCR MAMMO BI INCL CAD: CPT

## 2021-05-06 PROCEDURE — 77063 BREAST TOMOSYNTHESIS BI: CPT

## 2022-03-30 ENCOUNTER — OFFICE VISIT (OUTPATIENT)
Dept: OBSTETRICS AND GYNECOLOGY | Facility: CLINIC | Age: 57
End: 2022-03-30

## 2022-03-30 VITALS — BODY MASS INDEX: 28.63 KG/M2 | SYSTOLIC BLOOD PRESSURE: 110 MMHG | DIASTOLIC BLOOD PRESSURE: 58 MMHG | WEIGHT: 177.4 LBS

## 2022-03-30 DIAGNOSIS — Z12.31 ENCOUNTER FOR SCREENING MAMMOGRAM FOR BREAST CANCER: ICD-10-CM

## 2022-03-30 DIAGNOSIS — Z01.419 ENCOUNTER FOR GYNECOLOGICAL EXAMINATION (GENERAL) (ROUTINE) WITHOUT ABNORMAL FINDINGS: Primary | ICD-10-CM

## 2022-03-30 PROCEDURE — 99396 PREV VISIT EST AGE 40-64: CPT | Performed by: OBSTETRICS & GYNECOLOGY

## 2022-03-30 RX ORDER — ACYCLOVIR 400 MG/1
400 TABLET ORAL 2 TIMES DAILY
Qty: 60 TABLET | Refills: 11 | Status: SHIPPED | OUTPATIENT
Start: 2022-03-30

## 2022-03-30 RX ORDER — ESTRADIOL 0.1 MG/G
CREAM VAGINAL
Qty: 42.5 G | Refills: 11 | Status: SHIPPED | OUTPATIENT
Start: 2022-03-30 | End: 2022-03-31 | Stop reason: SDUPTHER

## 2022-03-30 NOTE — PROGRESS NOTES
Subjective   Chief Complaint   Patient presents with   • Gynecologic Exam     Patient here for annual exam and pap smear     Ilene Fowler is a 56 y.o. year old .  No LMP recorded. Patient is postmenopausal.  She presents to be seen because of annual exam  Doing well  [facility]  Does cologuard.     OTHER COMPLAINTS:  Nothing else    The following portions of the patient's history were reviewed and updated as appropriate:  She  has a past medical history of Abnormal Pap smear of cervix, Anxiety, Arthritis, History of colposcopy (10/2017), and HSV (herpes simplex virus) anogenital infection.  She does not have any pertinent problems on file.  She  has a past surgical history that includes Tubal ligation and Cervical biopsy w/ loop electrode excision.  Her family history includes Coronary artery disease in her brother; Diabetes in her brother; Lung cancer in her father.  She  reports that she has quit smoking. She has never used smokeless tobacco. She reports that she does not drink alcohol and does not use drugs.  Current Outpatient Medications   Medication Sig Dispense Refill   • acyclovir (ZOVIRAX) 400 MG tablet Take 1 tablet by mouth 2 (Two) Times a Day. Take no more than 5 doses a day. 60 tablet 11   • estradiol (ESTRACE) 0.1 MG/GM vaginal cream INSERT 1/2 APPLICATORFUL VAGINALLY AT BEDTIME THREE TIMES WEEKLY FOR 3 MONTHS THEN TWICE WEEKLY 42.5 g 11   • PARoxetine (PAXIL) 10 MG tablet        No current facility-administered medications for this visit.     Current Outpatient Medications on File Prior to Visit   Medication Sig   • acyclovir (ZOVIRAX) 400 MG tablet Take 1 tablet by mouth 2 (Two) Times a Day. Take no more than 5 doses a day.   • estradiol (ESTRACE) 0.1 MG/GM vaginal cream INSERT 1/2 APPLICATORFUL VAGINALLY AT BEDTIME THREE TIMES WEEKLY FOR 3 MONTHS THEN TWICE WEEKLY   • PARoxetine (PAXIL) 10 MG tablet      No current facility-administered medications on file prior to visit.     She has No  Known Allergies.    Social History    Tobacco Use      Smoking status: Former Smoker      Smokeless tobacco: Never Used    Review of Systems  Consitutional POS: nothing reported    NEG: anorexia or night sweats   Gastointestinal POS: nothing reported    NEG: bloating, change in bowel habits, melena or reflux symptoms   Genitourinary POS: nothing reported    NEG: dysuria or hematuria   Integument POS: nothing reported    NEG: moles that are changing in size, shape, color or rashes   Breast POS: nothing reported    NEG: persistent breast lump, skin dimpling or nipple discharge         Respiratory: negative  Cardiovascular: negative          Objective   /58   Wt 80.5 kg (177 lb 6.4 oz)   Breastfeeding No   BMI 28.63 kg/m²     General:  well developed; well nourished  no acute distress   Skin:  No suspicious lesions seen   Thyroid: normal to inspection and palpation   Lungs:  breathing is unlabored  clear to auscultation bilaterally   Heart:  Not performed.   Breasts:  Examined in supine position  Symmetric without masses or skin dimpling  Nipples normal without inversion, lesions or discharge  There are no palpable axillary nodes   Abdomen: soft, non-tender; no masses  no umbilical or inguinal hernias are present  no hepato-splenomegaly   Pelvis: Clinical staff was present for exam  External genitalia:  normal appearance of the external genitalia including Bartholin's and [place]'s glands.  :  urethral meatus normal;  Vaginal:  normal pink mucosa without prolapse or lesions.  Cervix:  normal appearance.  Uterus:  normal size, shape and consistency.  Adnexa:  normal bimanual exam of the adnexa.  Rectal:  digital rectal exam not performed; anus visually normal appearing.     Psychiatric: Alert and oriented ×3, mood and affect appropriate  HEENT: Atraumatic, normocephalic, normal scleral icterus  Extremities: 2+ pulses bilaterally, no edema      Lab Review   Liquid-based Pap Smear, Screening  (03/24/2021)      Imaging   Mammo Screening Digital Tomosynthesis Bilateral With CAD (05/06/2021 13:53)         Assessment   1. Normal PE     Plan   1. PAP done  2. Es cream and acyclovir refilled  3. MMG ordered  4. Diet/exercise    No orders of the defined types were placed in this encounter.         This note was electronically signed.      March 30, 2022

## 2022-03-31 RX ORDER — ESTRADIOL 0.1 MG/G
CREAM VAGINAL
Qty: 42.5 G | Refills: 11 | Status: SHIPPED | OUTPATIENT
Start: 2022-03-31

## 2022-04-12 DIAGNOSIS — Z12.31 ENCOUNTER FOR SCREENING MAMMOGRAM FOR BREAST CANCER: ICD-10-CM

## 2022-04-12 DIAGNOSIS — Z01.419 ENCOUNTER FOR GYNECOLOGICAL EXAMINATION (GENERAL) (ROUTINE) WITHOUT ABNORMAL FINDINGS: ICD-10-CM

## 2022-05-26 ENCOUNTER — APPOINTMENT (OUTPATIENT)
Dept: MAMMOGRAPHY | Facility: HOSPITAL | Age: 57
End: 2022-05-26

## 2022-05-26 ENCOUNTER — HOSPITAL ENCOUNTER (OUTPATIENT)
Dept: MAMMOGRAPHY | Facility: HOSPITAL | Age: 57
Discharge: HOME | End: 2022-05-26
Admitting: OBSTETRICS & GYNECOLOGY

## 2022-05-26 DIAGNOSIS — Z12.31 ENCOUNTER FOR SCREENING MAMMOGRAM FOR BREAST CANCER: ICD-10-CM

## 2022-05-26 DIAGNOSIS — Z01.419 ENCOUNTER FOR GYNECOLOGICAL EXAMINATION (GENERAL) (ROUTINE) WITHOUT ABNORMAL FINDINGS: ICD-10-CM

## 2022-05-26 PROCEDURE — 77067 SCR MAMMO BI INCL CAD: CPT

## 2022-05-26 PROCEDURE — 77063 BREAST TOMOSYNTHESIS BI: CPT

## 2023-04-17 RX ORDER — ESTRADIOL 0.1 MG/G
CREAM VAGINAL
Qty: 42.5 G | Refills: 10 | Status: SHIPPED | OUTPATIENT
Start: 2023-04-17

## 2023-04-17 RX ORDER — ACYCLOVIR 400 MG/1
400 TABLET ORAL 2 TIMES DAILY
Qty: 60 TABLET | Refills: 10 | Status: SHIPPED | OUTPATIENT
Start: 2023-04-17

## 2023-05-03 ENCOUNTER — OFFICE VISIT (OUTPATIENT)
Dept: OBSTETRICS AND GYNECOLOGY | Facility: CLINIC | Age: 58
End: 2023-05-03
Payer: COMMERCIAL

## 2023-05-03 VITALS — WEIGHT: 164.8 LBS | BODY MASS INDEX: 26.6 KG/M2 | DIASTOLIC BLOOD PRESSURE: 62 MMHG | SYSTOLIC BLOOD PRESSURE: 100 MMHG

## 2023-05-03 DIAGNOSIS — Z01.419 ENCOUNTER FOR GYNECOLOGICAL EXAMINATION WITHOUT ABNORMAL FINDING: Primary | ICD-10-CM

## 2023-05-03 DIAGNOSIS — Z12.31 ENCOUNTER FOR SCREENING MAMMOGRAM FOR MALIGNANT NEOPLASM OF BREAST: ICD-10-CM

## 2023-05-03 RX ORDER — AZELASTINE 1 MG/ML
SPRAY, METERED NASAL
COMMUNITY
Start: 2023-04-18

## 2023-05-03 NOTE — PROGRESS NOTES
Subjective   Chief Complaint   Patient presents with   • Gynecologic Exam     Yearly exam and pap smear     Ilene Fowler is a 57 y.o. year old .  No LMP recorded. Patient is postmenopausal.  She presents to be seen because of annual exam.     OTHER COMPLAINTS:  Nothing else    The following portions of the patient's history were reviewed and updated as appropriate:  She  has a past medical history of Abnormal Pap smear of cervix, Anxiety, Arthritis, History of colposcopy (10/2017), HPV (human papilloma virus) infection, HSV (herpes simplex virus) anogenital infection, PONV (postoperative nausea and vomiting), and Varicella.  She does not have any pertinent problems on file.  She  has a past surgical history that includes Tubal ligation and Cervical biopsy w/ loop electrode excision.  Her family history includes Coronary artery disease in her brother; Diabetes in her brother; Lung cancer in her father.  She  reports that she has quit smoking. Her smoking use included cigarettes and electronic cigarette. She has a 35.00 pack-year smoking history. She has never used smokeless tobacco. She reports that she does not drink alcohol and does not use drugs.  Current Outpatient Medications   Medication Sig Dispense Refill   • acyclovir (ZOVIRAX) 400 MG tablet TAKE 1 TABLET BY MOUTH 2 (TWO) TIMES A DAY. TAKE NO MORE THAN 5 DOSES A DAY. 60 tablet 10   • estradiol (ESTRACE) 0.1 MG/GM vaginal cream INSERT 1/2 APPLICATORFUL VAGINALLY AT BEDTIME THREE TIMES WEEKLY FOR 3 MONTHS THEN TWICE WEEKLY 42.5 g 10   • PARoxetine (PAXIL) 10 MG tablet      • azelastine (ASTELIN) 0.1 % nasal spray        No current facility-administered medications for this visit.     Current Outpatient Medications on File Prior to Visit   Medication Sig   • acyclovir (ZOVIRAX) 400 MG tablet TAKE 1 TABLET BY MOUTH 2 (TWO) TIMES A DAY. TAKE NO MORE THAN 5 DOSES A DAY.   • estradiol (ESTRACE) 0.1 MG/GM vaginal cream INSERT 1/2 APPLICATORFUL VAGINALLY  AT BEDTIME THREE TIMES WEEKLY FOR 3 MONTHS THEN TWICE WEEKLY   • PARoxetine (PAXIL) 10 MG tablet    • azelastine (ASTELIN) 0.1 % nasal spray      No current facility-administered medications on file prior to visit.     She has No Known Allergies.    Social History    Tobacco Use      Smoking status: Former        Packs/day: 1.00        Years: 35.00        Pack years: 35        Types: Cigarettes, Electronic Cigarette      Smokeless tobacco: Never    Review of Systems  Consitutional POS: nothing reported    NEG: anorexia or night sweats   Gastointestinal POS: nothing reported    NEG: bloating, change in bowel habits, melena or reflux symptoms   Genitourinary POS: nothing reported    NEG: dysuria or hematuria   Integument POS: nothing reported    NEG: moles that are changing in size, shape, color or rashes   Breast POS: nothing reported    NEG: persistent breast lump, skin dimpling or nipple discharge         Respiratory: negative  Cardiovascular: negative          Objective   /62   Wt 74.8 kg (164 lb 12.8 oz)   BMI 26.60 kg/m²     General:  well developed; well nourished  no acute distress   Skin:  No suspicious lesions seen   Thyroid: normal to inspection and palpation   Lungs:  breathing is unlabored  clear to auscultation bilaterally   Heart:  regular rate and rhythm, S1, S2 normal, no murmur, click, rub or gallop   Breasts:  Examined in supine position  Symmetric without masses or skin dimpling  Nipples normal without inversion, lesions or discharge  There are no palpable axillary nodes   Abdomen: soft, non-tender; no masses  no umbilical or inguinal hernias are present  no hepato-splenomegaly   Pelvis: Clinical staff was present for exam  External genitalia:  normal appearance of the external genitalia including Bartholin's and Ronald's glands.  :  urethral meatus normal;  Vaginal:  normal pink mucosa without prolapse or lesions.  Cervix:  normal appearance.  Uterus:  normal size, shape and  consistency.  Adnexa:  normal bimanual exam of the adnexa.  Rectal:  digital rectal exam not performed; anus visually normal appearing.     Psychiatric: Alert and oriented ×3, mood and affect appropriate  HEENT: Atraumatic, normocephalic, normal scleral icterus  Extremities: 2+ pulses bilaterally, no edema      Lab Review   No data reviewed    Imaging   Mammo Screening Digital Tomosynthesis Bilateral With CAD (05/26/2022 08:40)         Assessment   1. PE WNL     Plan   1. PAP done  2. MMG ordered  3. Diet/exercise    No orders of the defined types were placed in this encounter.         This note was electronically signed.      May 3, 2023

## 2023-05-08 LAB — REF LAB TEST METHOD: NORMAL

## 2024-06-03 RX ORDER — ESTRADIOL 0.1 MG/G
CREAM VAGINAL
Qty: 42.5 G | Refills: 1 | Status: SHIPPED | OUTPATIENT
Start: 2024-06-03

## 2024-06-17 ENCOUNTER — TRANSCRIBE ORDERS (OUTPATIENT)
Dept: ADMINISTRATIVE | Facility: HOSPITAL | Age: 59
End: 2024-06-17
Payer: COMMERCIAL

## 2024-06-17 DIAGNOSIS — Z12.31 SCREENING MAMMOGRAM FOR BREAST CANCER: Primary | ICD-10-CM

## 2024-09-17 RX ORDER — ACYCLOVIR 400 MG/1
400 TABLET ORAL 2 TIMES DAILY
Qty: 60 TABLET | Refills: 10 | OUTPATIENT
Start: 2024-09-17

## 2024-09-18 DIAGNOSIS — B00.9 HSV INFECTION: Primary | ICD-10-CM

## 2024-09-18 RX ORDER — ACYCLOVIR 400 MG/1
400 TABLET ORAL 2 TIMES DAILY
Qty: 60 TABLET | Refills: 10 | Status: SHIPPED | OUTPATIENT
Start: 2024-09-18

## 2024-10-01 ENCOUNTER — HOSPITAL ENCOUNTER (OUTPATIENT)
Dept: MAMMOGRAPHY | Facility: HOSPITAL | Age: 59
Discharge: HOME | End: 2024-10-01
Admitting: NURSE PRACTITIONER
Payer: COMMERCIAL

## 2024-10-01 DIAGNOSIS — Z12.31 SCREENING MAMMOGRAM FOR BREAST CANCER: ICD-10-CM

## 2024-10-01 PROCEDURE — 77063 BREAST TOMOSYNTHESIS BI: CPT

## 2024-10-01 PROCEDURE — 77067 SCR MAMMO BI INCL CAD: CPT

## 2024-10-25 RX ORDER — ESTRADIOL 0.1 MG/G
CREAM VAGINAL
Qty: 42.5 G | Refills: 1 | Status: SHIPPED | OUTPATIENT
Start: 2024-10-25

## 2025-04-18 RX ORDER — ESTRADIOL 0.1 MG/G
CREAM VAGINAL
Qty: 42.5 G | Refills: 1 | OUTPATIENT
Start: 2025-04-18

## 2025-04-18 RX ORDER — ESTRADIOL 0.1 MG/G
CREAM VAGINAL
Qty: 42.5 G | Refills: 0 | Status: SHIPPED | OUTPATIENT
Start: 2025-04-18

## 2025-04-18 NOTE — TELEPHONE ENCOUNTER
Patient hasn't been seen in office since 05/03/2023, no further appointments have been scheduled. Patient needs to call to schedule before any further refills are given.

## 2025-04-18 NOTE — TELEPHONE ENCOUNTER
Caller: Ilene Fowler    Relationship: Self    Best call back number: [phone]     What is the best time to reach you: IF NEEDED, ANYTIME.     PATIENT CALLED TO SCHEDULE ANNUAL. HUB SCHEDULED FIRST AVAILABLE 05/21/25. PATIENT IS REQUESTING Estradiol 0.1 MG/GM REFILLS TO COVER UNTIL APPT. SERO DAY SUPPLY.   HUB CONFIRMED PATIENT USES [facility].

## 2025-05-02 ENCOUNTER — PATIENT ROUNDING (BHMG ONLY) (OUTPATIENT)
Dept: PULMONOLOGY | Facility: CLINIC | Age: 60
End: 2025-05-02
Payer: COMMERCIAL

## 2025-05-02 ENCOUNTER — OFFICE VISIT (OUTPATIENT)
Dept: PULMONOLOGY | Facility: CLINIC | Age: 60
End: 2025-05-02
Payer: COMMERCIAL

## 2025-05-02 ENCOUNTER — RESULTS FOLLOW-UP (OUTPATIENT)
Dept: PULMONOLOGY | Facility: CLINIC | Age: 60
End: 2025-05-02

## 2025-05-02 VITALS
DIASTOLIC BLOOD PRESSURE: 78 MMHG | SYSTOLIC BLOOD PRESSURE: 128 MMHG | BODY MASS INDEX: 26.6 KG/M2 | HEART RATE: 73 BPM | RESPIRATION RATE: 18 BRPM | HEIGHT: 66 IN | OXYGEN SATURATION: 98 %

## 2025-05-02 DIAGNOSIS — R91.1 LUNG NODULE SEEN ON IMAGING STUDY: Primary | ICD-10-CM

## 2025-05-02 DIAGNOSIS — R91.1 LUNG NODULE SEEN ON IMAGING STUDY: ICD-10-CM

## 2025-05-02 DIAGNOSIS — R06.02 SHORTNESS OF BREATH: ICD-10-CM

## 2025-05-02 DIAGNOSIS — R06.02 SHORTNESS OF BREATH: Primary | ICD-10-CM

## 2025-05-02 DIAGNOSIS — Z87.891 PERSONAL HISTORY OF NICOTINE DEPENDENCE: ICD-10-CM

## 2025-05-02 RX ORDER — ALBUTEROL SULFATE 90 UG/1
2 INHALANT RESPIRATORY (INHALATION) EVERY 4 HOURS PRN
Qty: 6.7 G | Refills: 5 | Status: SHIPPED | OUTPATIENT
Start: 2025-05-02

## 2025-05-02 RX ORDER — FEXOFENADINE HYDROCHLORIDE 180 MG/1
TABLET, FILM COATED ORAL
COMMUNITY
Start: 2025-04-29

## 2025-05-02 NOTE — PROGRESS NOTES
New Pulmonary Patient Office Visit      Patient Name: Ilene Fowler    Referring Physician: Maria Esther Ballesteros AP*    Chief Complaint:    Chief Complaint   Patient presents with    Breathing Problem    Consult       History of Present Illness: Ilene Fowler is a 59 y.o. female who is here today to establish care with Pulmonary.      Duration: COPD changes noted on CT scan in January 2025, 6 mm lung nodule also noted in January 2025   severity: Moderate  Associated Symptoms: Intermittent cough, no wheezing, shortness of breath with exertion  Exercise Tolerance: Stays very active, still works as a hairdresser several days per week, also watches her grandchild who is a toddler 2 days/week,  timing: daily  Exacerbating Factors: Does notice some increase in cough and shortness of breath with noxious odors such as some of the hairsprays  Relieving Factors: rest     Current Regimen:   Inhalers: None  Nebs: None    Supplemental Oxygen: None  DME Company: None      Subjective      Review of Systems:   Review of Systems   Respiratory:  Positive for cough and shortness of breath. Negative for wheezing.    Musculoskeletal:  Positive for arthralgias.   Allergic/Immunologic: Positive for environmental allergies.   Psychiatric/Behavioral:  Positive for sleep disturbance.        Past Medical History:   Past Medical History:   Diagnosis Date    Abnormal Pap smear of cervix     Anxiety     Arthritis     History of colposcopy 10/2017    HPV (human papilloma virus) infection     HSV (herpes simplex virus) anogenital infection     PONV (postoperative nausea and vomiting)     Varicella        Past Surgical History:   Past Surgical History:   Procedure Laterality Date    CERVICAL BIOPSY  W/ LOOP ELECTRODE EXCISION      TUBAL ABDOMINAL LIGATION         Family History:   Family History   Problem Relation Age of Onset    Lung cancer Father     Coronary artery disease Brother     Diabetes Brother     Breast cancer Neg Hx   "      Social History:   Social History     Socioeconomic History    Marital status:    Tobacco Use    Smoking status: Former     Average packs/day: 1.3 packs/day for 35.0 years (43.8 ttl pk-yrs)     Types: Cigarettes, Electronic Cigarette     Start date: 4/1/1985    Smokeless tobacco: Never   Vaping Use    Vaping status: Never Used   Substance and Sexual Activity    Alcohol use: No    Drug use: No    Sexual activity: Yes     Partners: Male     Birth control/protection: Post-menopausal       Medications:     Current Outpatient Medications:     acyclovir (ZOVIRAX) 400 MG tablet, Take 1 tablet by mouth 2 (Two) Times a Day. Take no more than 5 doses a day., Disp: 60 tablet, Rfl: 10    Allergy Relief 180 MG tablet, , Disp: , Rfl:     azelastine (ASTELIN) 0.1 % nasal spray, , Disp: , Rfl:     PARoxetine (PAXIL) 10 MG tablet, , Disp: , Rfl:     albuterol sulfate  (90 Base) MCG/ACT inhaler, Inhale 2 puffs Every 4 (Four) Hours As Needed for Wheezing., Disp: 6.7 g, Rfl: 5    estradiol (ESTRACE) 0.1 MG/GM vaginal cream, INSERT 1/2 APPLICATORFUL VAGINALLY AT BEDTIME THREE TIMES WEEKLY FOR 3 MONTHS THEN TWICE WEEKLY (Patient not taking: Reported on 5/2/2025), Disp: 42.5 g, Rfl: 0    Allergies:   No Known Allergies    Objective     Physical Exam:  Vital Signs:   Vitals:    05/02/25 1032   BP: 128/78   Pulse: 73   Resp: 18   SpO2: 98%   Height: 167.6 cm (66\")       Physical Exam  Vitals and nursing note reviewed.   Constitutional:       General: She is not in acute distress.     Appearance: She is well-developed. She is not ill-appearing.   HENT:      Head: Normocephalic and atraumatic.      Right Ear: External ear normal.      Left Ear: External ear normal.      Nose: Nose normal. Rhinorrhea present. No congestion.      Mouth/Throat:      Mouth: Mucous membranes are moist.      Pharynx: Oropharynx is clear. No oropharyngeal exudate or posterior oropharyngeal erythema.   Eyes:      General:         Right eye: No " "discharge.         Left eye: No discharge.      Extraocular Movements: Extraocular movements intact.      Conjunctiva/sclera: Conjunctivae normal.   Neck:      Trachea: No tracheal deviation.   Cardiovascular:      Rate and Rhythm: Normal rate and regular rhythm.      Heart sounds: No murmur heard.  Pulmonary:      Effort: No respiratory distress.      Breath sounds: No wheezing or rhonchi.   Abdominal:      General: There is no distension.      Palpations: Abdomen is soft.   Musculoskeletal:         General: No tenderness. Normal range of motion.      Cervical back: Normal range of motion and neck supple.      Right lower leg: No edema.      Left lower leg: No edema.   Skin:     General: Skin is warm and dry.      Findings: No rash.   Neurological:      Mental Status: She is alert and oriented to person, place, and time.      Coordination: Coordination normal.      Gait: Gait normal.   Psychiatric:         Mood and Affect: Mood normal.         Behavior: Behavior normal.         Thought Content: Thought content normal.         Judgment: Judgment normal.       Mallampati Score: II (hard and soft palate, upper portion of tonsils anduvula visible)    Results Review:   Labs: Reviewed.    No results found for: \"CBCDIF\", \"CMP\"     Micro: As of May 2, 2025   No results found for: \"RESPCX\"  No results found for: \"BLOODCX\"  No results found for: \"URINECX\"  No results found for: \"MRSACX\"  No results found for: \"MRSAPCR\"  No results found for: \"URCX\"  No components found for: \"LOWRESPCF\"  No results found for: \"THROATCX\"  No results found for: \"CULTURES\"  No components found for: \"STREPBCX\"  No results found for: \"STREPPNEUAG\"  No results found for: \"LEGIONELLA\"  No results found for: \"MYCOPLASCX\"  No results found for: \"GCCX\"  No results found for: \"WOUNDCX\"  No results found for: \"BODYFLDCX\"    ABG: No results found for: \"PHART\", \"JPH4WOM\", \"PO2ART\", \"HGBBG\", \"R3BSWBNV\", \"CFIO2\", \"FCOHB\", \"CARBOXYHGB\", \"FMETHB\"    Echo: "     Radiology Scans:   Last CT scan was reviewed in great detail with the patient. Images reviewed personally.     January 2025 CT scan for lung cancer screening was done as an outside facility.  Report shows right lower lobe 6 mm pulmonary nodule.  Also noted hyperinflation with biapical pleural thickening with subpleural interstitial changes consistent with COPD with fibrosis    PFT IMPRESSION:   May 2025 spirometry showed FEV1 102%, FEV1/FVC ratio 77.  No significant bronchodilator responsiveness.  Assessment / Plan      Assessment/Plan:    1. Shortness of breath  COPD changes noted on CT scan, but normal spirometry.  At this point, advised her that she can use as needed albuterol when she has symptoms and will check full PFTs prior to next clinic visit.  No current evidence of obstructive lung disease despite imaging findings.    - Spirometry - Pre & Post Bronchodilator; Future  - Complete PFT - Pre & Post Bronchodilator; Future  - albuterol sulfate  (90 Base) MCG/ACT inhaler; Inhale 2 puffs Every 4 (Four) Hours As Needed for Wheezing.  Dispense: 6.7 g; Refill: 5    2. Lung nodule seen on imaging study  Follow-up 6 mm nodule in July.  Follow-up afterwards  - CT Chest Without Contrast; Future    3. Personal history of nicotine dependence  Quit 5 years ago.  Will require yearly CT scans once lung nodule surveillance is completed       Follow Up:   Return in about 3 months (around 8/2/2025) for CT scan follow up, PFT day of clinic appointment.    Vidhya Nguyen MD  Pulmonary/Critical Care Physician   [facility]    This is electronically signed by Vidhya Nguyen MD  05/02/2025 10:38 EDT       Please note that portions of this note may have been completed with a voice recognition program. Efforts were made to edit the dictations, but occasionally words are mistranscribed.

## 2025-05-21 ENCOUNTER — OFFICE VISIT (OUTPATIENT)
Dept: OBSTETRICS AND GYNECOLOGY | Facility: CLINIC | Age: 60
End: 2025-05-21
Payer: COMMERCIAL

## 2025-05-21 VITALS
HEIGHT: 66 IN | DIASTOLIC BLOOD PRESSURE: 60 MMHG | WEIGHT: 188 LBS | BODY MASS INDEX: 30.22 KG/M2 | SYSTOLIC BLOOD PRESSURE: 110 MMHG

## 2025-05-21 DIAGNOSIS — Z12.31 ENCOUNTER FOR SCREENING MAMMOGRAM FOR MALIGNANT NEOPLASM OF BREAST: Primary | ICD-10-CM

## 2025-05-21 DIAGNOSIS — B00.9 HSV INFECTION: ICD-10-CM

## 2025-05-21 DIAGNOSIS — Z01.419 ENCOUNTER FOR GYNECOLOGICAL EXAMINATION WITHOUT ABNORMAL FINDING: ICD-10-CM

## 2025-05-21 RX ORDER — ESTRADIOL 0.1 MG/G
CREAM VAGINAL
Qty: 42.5 G | Refills: 0 | Status: SHIPPED | OUTPATIENT
Start: 2025-05-21

## 2025-05-21 RX ORDER — ACYCLOVIR 400 MG/1
400 TABLET ORAL 2 TIMES DAILY
Qty: 60 TABLET | Refills: 10 | Status: SHIPPED | OUTPATIENT
Start: 2025-05-21

## 2025-05-28 LAB — REF LAB TEST METHOD: NORMAL

## 2025-07-11 ENCOUNTER — HOSPITAL ENCOUNTER (OUTPATIENT)
Dept: CT IMAGING | Facility: HOSPITAL | Age: 60
Discharge: HOME | End: 2025-07-11
Payer: COMMERCIAL

## 2025-07-11 DIAGNOSIS — R91.1 LUNG NODULE SEEN ON IMAGING STUDY: ICD-10-CM

## 2025-07-11 PROCEDURE — 71250 CT THORAX DX C-: CPT

## 2025-08-08 ENCOUNTER — OFFICE VISIT (OUTPATIENT)
Dept: PULMONOLOGY | Facility: CLINIC | Age: 60
End: 2025-08-08
Payer: COMMERCIAL

## 2025-08-08 VITALS
HEIGHT: 66 IN | DIASTOLIC BLOOD PRESSURE: 64 MMHG | WEIGHT: 188 LBS | BODY MASS INDEX: 30.22 KG/M2 | SYSTOLIC BLOOD PRESSURE: 118 MMHG | RESPIRATION RATE: 18 BRPM | HEART RATE: 64 BPM | OXYGEN SATURATION: 100 %

## 2025-08-08 DIAGNOSIS — Z87.891 PERSONAL HISTORY OF NICOTINE DEPENDENCE: ICD-10-CM

## 2025-08-08 DIAGNOSIS — R91.1 LUNG NODULE SEEN ON IMAGING STUDY: Primary | ICD-10-CM

## 2025-08-08 DIAGNOSIS — R06.02 SOB (SHORTNESS OF BREATH): ICD-10-CM

## 2025-08-08 DIAGNOSIS — R06.02 SOB (SHORTNESS OF BREATH): Primary | ICD-10-CM
